# Patient Record
Sex: FEMALE | Race: WHITE | NOT HISPANIC OR LATINO | Employment: OTHER | URBAN - METROPOLITAN AREA
[De-identification: names, ages, dates, MRNs, and addresses within clinical notes are randomized per-mention and may not be internally consistent; named-entity substitution may affect disease eponyms.]

---

## 2017-08-30 ENCOUNTER — ALLSCRIPTS OFFICE VISIT (OUTPATIENT)
Dept: OTHER | Facility: OTHER | Age: 62
End: 2017-08-30

## 2018-01-15 VITALS
HEART RATE: 88 BPM | DIASTOLIC BLOOD PRESSURE: 112 MMHG | SYSTOLIC BLOOD PRESSURE: 184 MMHG | TEMPERATURE: 100 F | RESPIRATION RATE: 16 BRPM

## 2018-11-20 ENCOUNTER — OFFICE VISIT (OUTPATIENT)
Dept: SURGERY | Facility: CLINIC | Age: 63
End: 2018-11-20
Payer: MEDICARE

## 2018-11-20 VITALS
DIASTOLIC BLOOD PRESSURE: 88 MMHG | BODY MASS INDEX: 40.74 KG/M2 | WEIGHT: 221.4 LBS | HEIGHT: 62 IN | TEMPERATURE: 98 F | SYSTOLIC BLOOD PRESSURE: 158 MMHG

## 2018-11-20 DIAGNOSIS — K43.2 INCISIONAL HERNIA, WITHOUT OBSTRUCTION OR GANGRENE: Primary | ICD-10-CM

## 2018-11-20 PROCEDURE — 99215 OFFICE O/P EST HI 40 MIN: CPT | Performed by: SURGERY

## 2018-11-20 RX ORDER — DULOXETIN HYDROCHLORIDE 60 MG/1
30 CAPSULE, DELAYED RELEASE ORAL DAILY
COMMUNITY

## 2018-11-20 RX ORDER — IMIPRAMINE HCL 50 MG
50 TABLET ORAL
COMMUNITY

## 2018-11-20 RX ORDER — LOSARTAN POTASSIUM 100 MG/1
100 TABLET ORAL DAILY
COMMUNITY

## 2018-11-20 RX ORDER — PREDNISONE 1 MG/1
5 TABLET ORAL DAILY
COMMUNITY
End: 2018-12-05

## 2018-11-20 RX ORDER — CLONIDINE HYDROCHLORIDE 0.3 MG/1
0.3 TABLET ORAL 2 TIMES DAILY
COMMUNITY

## 2018-11-20 RX ORDER — DILTIAZEM HYDROCHLORIDE 120 MG/1
120 TABLET, FILM COATED ORAL EVERY 8 HOURS SCHEDULED
COMMUNITY

## 2018-11-20 RX ORDER — MELOXICAM 15 MG/1
15 TABLET ORAL DAILY
COMMUNITY

## 2018-11-20 RX ORDER — DULOXETIN HYDROCHLORIDE 30 MG/1
30 CAPSULE, DELAYED RELEASE ORAL DAILY
COMMUNITY

## 2018-11-20 NOTE — PROGRESS NOTES
Office Visit - General Surgery  Silke Murguia MRN: [id number]  Encounter: [id number]    Assessment and Plan    Problem List Items Addressed This Visit        Other    Incisional hernia, without obstruction or gangrene - Primary     I spent about 40 minutes going over her case with her  She has a longstanding history of multiple medical comorbidities including myasthenia gravis  She reports having sinus arrest syndrome  She complains of chest pain at night specially when laying on her 1 side  She has shortness of breath almost continuously even on steroids which seemed to be helping  She has a large incisional hernia which has gotten larger  She keeps asking if fixing the hernia will make her lung issues better  I told her reducing the a hernia why actually push up on her diaphragms and may compromise that a little more  I told him very reluctant to go ahead with her cardiac condition  I told her she should follow up with her cardiologist to see where things stand with that  She knows that being on steroids that decreases wound healing  She can cut back on those somewhat  I had a long talk with her about types of repair either doing a component separation or bridging type repair with mesh  We agreed that doing more of an intraperitoneal underlay mesh with bridging may be more appropriate for her which should directly decrease the operative time  I think this would have less effect on her lungs and doing a component separation with things may be tightened up in increase her respiratory difficulties  She will give us call when she seen her cardiologist with and I will discuss with her further  Chief Complaint:  Silke Murguia is a 70 y o  female who presents for Abdominal Pain (Consult abdominal wall hernia)    Subjective  68-year-old female who presents with a known incisional hernia  She was seen here last year for evaluation and put off because of medical comorbidities    She returns saying that the hernia is getting bigger  Things are pushing up on her lung on the right side  She has discomfort in the upper abdomen  Her stools have actually improved  Past Medical History  History reviewed  No pertinent past medical history  Past Surgical History  Past Surgical History:   Procedure Laterality Date    ABDOMINAL SURGERY      HYSTERECTOMY      JOINT REPLACEMENT         Family History  History reviewed  No pertinent family history  Medications  No current outpatient prescriptions on file prior to visit  No current facility-administered medications on file prior to visit  Allergies  Allergies   Allergen Reactions    Duramorph [Morphine] Hives    Lovenox [Enoxaparin] Other (See Comments)     Chemical reaction    Lyrica [Pregabalin] Hives    Suture Hives    Tegaderm Chg Dressing [Chlorhexidine] Other (See Comments)     Chemical burn    Epinephrine     Latex     Procardia [Nifedipine] Hives    Sulfa Antibiotics        Review of Systems   Constitutional: Negative for activity change, appetite change and chills  HENT: Negative for dental problem and ear pain  Eyes: Negative for photophobia and visual disturbance  Respiratory: Positive for shortness of breath  Cardiovascular: Positive for chest pain  Gastrointestinal: Positive for abdominal distention  Negative for diarrhea and nausea  Endocrine: Negative for cold intolerance and heat intolerance  Genitourinary: Negative for hematuria  Musculoskeletal: Negative for arthralgias  Skin: Negative for color change  Allergic/Immunologic: Positive for immunocompromised state  Neurological: Negative for dizziness and syncope  Hematological: Negative for adenopathy  Psychiatric/Behavioral: Negative for behavioral problems and confusion         Objective  Vitals:    11/20/18 1026   BP: 158/88   Temp: 98 °F (36 7 °C)       Physical Exam   Constitutional:   Obese white female   HENT:   Head: Normocephalic and atraumatic  Eyes: Pupils are equal, round, and reactive to light  No scleral icterus  Abdominal:   Midline incision lower abdomen left-sided transverse incision both well healed  Large bulging especially standing in the upper abdomen extending to the left side  Laying down is at least a 16 x 16 cm defect in the fascia in the upper abdomen  To the left of the umbilicus hernia extends laterally about 8 cm    Remaining abdomen is soft

## 2018-11-20 NOTE — LETTER
November 20, 2018     Daxa Fraser DO  [address] [facility] [address]    Patient: Alejandro Hurt   YOB: 1955   Date of Visit: 11/20/2018       Dear Dr Jack Garcia: Thank you for referring Alejandro Hurt to me for evaluation  Below are my notes for this consultation  If you have questions, please do not hesitate to call me  I look forward to following your patient along with you  Sincerely,        Parke Fothergill, MD        CC: No Recipients  Parke Fothergill, MD  11/20/2018 12:06 PM  Sign at close encounter  Office Visit - [address] MRN: [id number]  Encounter: [id number]    Assessment and Plan    Problem List Items Addressed This Visit        Other    Incisional hernia, without obstruction or gangrene - Primary     I spent about 40 minutes going over her case with her  She has a longstanding history of multiple medical comorbidities including myasthenia gravis  She reports having sinus arrest syndrome  She complains of chest pain at night specially when laying on her 1 side  She has shortness of breath almost continuously even on steroids which seemed to be helping  She has a large incisional hernia which has gotten larger  She keeps asking if fixing the hernia will make her lung issues better  I told her reducing the a hernia why actually push up on her diaphragms and may compromise that a little more  I told him very reluctant to go ahead with her cardiac condition  I told her she should follow up with her cardiologist to see where things stand with that  She knows that being on steroids that decreases wound healing  She can cut back on those somewhat  I had a long talk with her about types of repair either doing a component separation or bridging type repair with mesh  We agreed that doing more of an intraperitoneal underlay mesh with bridging may be more appropriate for her which should directly decrease the operative time    I think this would have less effect on her lungs and doing a component separation with things may be tightened up in increase her respiratory difficulties  She will give us call when she seen her cardiologist with and I will discuss with her further  Chief Complaint:  Abhilash Clayton is a 70 y o  female who presents for Abdominal Pain (Consult abdominal wall hernia)    Subjective  26-year-old female who presents with a known incisional hernia  She was seen here last year for evaluation and put off because of medical comorbidities  She returns saying that the hernia is getting bigger  Things are pushing up on her lung on the right side  She has discomfort in the upper abdomen  Her stools have actually improved  Past Medical History  History reviewed  No pertinent past medical history  Past Surgical History  Past Surgical History:   Procedure Laterality Date    ABDOMINAL SURGERY      HYSTERECTOMY      JOINT REPLACEMENT         Family History  History reviewed  No pertinent family history  Medications  No current outpatient prescriptions on file prior to visit  No current facility-administered medications on file prior to visit  Allergies  Allergies   Allergen Reactions    Duramorph [Morphine] Hives    Lovenox [Enoxaparin] Other (See Comments)     Chemical reaction    Lyrica [Pregabalin] Hives    Suture Hives    Tegaderm Chg Dressing [Chlorhexidine] Other (See Comments)     Chemical burn    Epinephrine     Latex     Procardia [Nifedipine] Hives    Sulfa Antibiotics        Review of Systems   Constitutional: Negative for activity change, appetite change and chills  HENT: Negative for dental problem and ear pain  Eyes: Negative for photophobia and visual disturbance  Respiratory: Positive for shortness of breath  Cardiovascular: Positive for chest pain  Gastrointestinal: Positive for abdominal distention  Negative for diarrhea and nausea     Endocrine: Negative for cold intolerance and heat intolerance  Genitourinary: Negative for hematuria  Musculoskeletal: Negative for arthralgias  Skin: Negative for color change  Allergic/Immunologic: Positive for immunocompromised state  Neurological: Negative for dizziness and syncope  Hematological: Negative for adenopathy  Psychiatric/Behavioral: Negative for behavioral problems and confusion  Objective  Vitals:    11/20/18 1026   BP: 158/88   Temp: 98 °F (36 7 °C)       Physical Exam   Constitutional:   Obese white female   HENT:   Head: Normocephalic and atraumatic  Eyes: Pupils are equal, round, and reactive to light  No scleral icterus  Abdominal:   Midline incision lower abdomen left-sided transverse incision both well healed  Large bulging especially standing in the upper abdomen extending to the left side  Laying down is at least a 16 x 16 cm defect in the fascia in the upper abdomen  To the left of the umbilicus hernia extends laterally about 8 cm    Remaining abdomen is soft

## 2018-11-20 NOTE — ASSESSMENT & PLAN NOTE
I spent about 40 minutes going over her case with her  She has a longstanding history of multiple medical comorbidities including myasthenia gravis  She reports having sinus arrest syndrome  She complains of chest pain at night specially when laying on her 1 side  She has shortness of breath almost continuously even on steroids which seemed to be helping  She has a large incisional hernia which has gotten larger  She keeps asking if fixing the hernia will make her lung issues better  I told her reducing the a hernia why actually push up on her diaphragms and may compromise that a little more  I told him very reluctant to go ahead with her cardiac condition  I told her she should follow up with her cardiologist to see where things stand with that  She knows that being on steroids that decreases wound healing  She can cut back on those somewhat  I had a long talk with her about types of repair either doing a component separation or bridging type repair with mesh  We agreed that doing more of an intraperitoneal underlay mesh with bridging may be more appropriate for her which should directly decrease the operative time  I think this would have less effect on her lungs and doing a component separation with things may be tightened up in increase her respiratory difficulties  She will give us call when she seen her cardiologist with and I will discuss with her further

## 2018-12-05 ENCOUNTER — OFFICE VISIT (OUTPATIENT)
Dept: URGENT CARE | Age: 63
End: 2018-12-05
Payer: MEDICARE

## 2018-12-05 VITALS
DIASTOLIC BLOOD PRESSURE: 114 MMHG | OXYGEN SATURATION: 96 % | BODY MASS INDEX: 40.67 KG/M2 | HEART RATE: 104 BPM | HEIGHT: 62 IN | SYSTOLIC BLOOD PRESSURE: 176 MMHG | RESPIRATION RATE: 20 BRPM | TEMPERATURE: 99.4 F | WEIGHT: 221 LBS

## 2018-12-05 DIAGNOSIS — J22 LOWER RESPIRATORY INFECTION: Primary | ICD-10-CM

## 2018-12-05 PROCEDURE — 99213 OFFICE O/P EST LOW 20 MIN: CPT | Performed by: PHYSICIAN ASSISTANT

## 2018-12-05 PROCEDURE — G0463 HOSPITAL OUTPT CLINIC VISIT: HCPCS | Performed by: PHYSICIAN ASSISTANT

## 2018-12-05 RX ORDER — CHOLECALCIFEROL (VITAMIN D3) 1250 MCG
CAPSULE ORAL
COMMUNITY
Start: 2018-07-14

## 2018-12-05 RX ORDER — IMIPRAMINE HCL 25 MG
1 TABLET ORAL
COMMUNITY

## 2018-12-05 RX ORDER — CLONIDINE HYDROCHLORIDE 0.3 MG/1
0.3 TABLET ORAL 3 TIMES DAILY
COMMUNITY
Start: 2018-11-05 | End: 2019-11-05

## 2018-12-05 RX ORDER — DULOXETIN HYDROCHLORIDE 30 MG/1
30 CAPSULE, DELAYED RELEASE ORAL
COMMUNITY
Start: 2018-11-05

## 2018-12-05 RX ORDER — BIOTIN 1 MG
TABLET ORAL
COMMUNITY

## 2018-12-05 RX ORDER — SUMATRIPTAN 50 MG/1
TABLET, FILM COATED ORAL
COMMUNITY

## 2018-12-05 RX ORDER — CLONIDINE HYDROCHLORIDE 0.2 MG/1
TABLET ORAL
COMMUNITY

## 2018-12-05 RX ORDER — MELOXICAM 15 MG/1
1 TABLET ORAL DAILY
COMMUNITY

## 2018-12-05 RX ORDER — LOSARTAN POTASSIUM 100 MG/1
1 TABLET ORAL DAILY
COMMUNITY

## 2018-12-05 RX ORDER — DULOXETIN HYDROCHLORIDE 60 MG/1
CAPSULE, DELAYED RELEASE ORAL DAILY
COMMUNITY

## 2018-12-05 RX ORDER — LISINOPRIL 5 MG/1
TABLET ORAL
COMMUNITY
Start: 2015-03-03

## 2018-12-05 RX ORDER — AMOXICILLIN AND CLAVULANATE POTASSIUM 875; 125 MG/1; MG/1
1 TABLET, FILM COATED ORAL EVERY 12 HOURS SCHEDULED
Qty: 14 TABLET | Refills: 0 | Status: SHIPPED | OUTPATIENT
Start: 2018-12-05 | End: 2018-12-12

## 2018-12-05 RX ORDER — DILTIAZEM HYDROCHLORIDE 120 MG/1
1 CAPSULE, COATED, EXTENDED RELEASE ORAL 2 TIMES DAILY
COMMUNITY

## 2018-12-05 RX ORDER — IMIPRAMINE HCL 50 MG
TABLET ORAL
COMMUNITY
Start: 2014-11-05

## 2018-12-05 RX ORDER — GABAPENTIN 300 MG/1
300 CAPSULE ORAL
COMMUNITY
Start: 2015-07-23

## 2018-12-05 NOTE — PATIENT INSTRUCTIONS
Take antibiotic as directed until completed  Follow up with PCP in 3-5 days  Proceed to  ER if symptoms worsen  Acute Bronchitis   AMBULATORY CARE:   Acute bronchitis  is swelling and irritation in the air passages of your lungs  This irritation may cause you to cough or have other breathing problems  Acute bronchitis often starts because of another illness, such as a cold or the flu  The illness spreads from your nose and throat to your windpipe and airways  Bronchitis is often called a chest cold  Acute bronchitis lasts about 3 to 6 weeks and is usually not a serious illness  Your cough can last for several weeks  You may have any of the following symptoms:   · A cough with sputum that may be clear, yellow, or green    · Feeling more tired than usual, and body aches    · A fever and chills    · Wheezing when you breathe    · A tight chest or pain when you breathe or cough  Seek care immediately if:   · You cough up blood  · Your lips or fingernails turn blue  · You feel like you are not getting enough air when you breathe  Contact your healthcare provider if:   · You have a fever  · Your breathing problems do not go away or get worse  · Your cough does not get better within 4 weeks  · You have questions or concerns about your condition or care  Self-care:   · Get more rest   Rest helps your body to heal  Slowly start to do more each day  Rest when you feel it is needed  · Avoid irritants in the air  Avoid chemicals, fumes, and dust  Wear a face mask if you must work around dust or fumes  Stay inside on days when air pollution levels are high  If you have allergies, stay inside when pollen counts are high  Do not use aerosol products, such as spray-on deodorant, bug spray, and hair spray  · Do not smoke or be around others who smoke  Nicotine and other chemicals in cigarettes and cigars damages the cilia that move mucus out of your lungs   Ask your healthcare provider for information if you currently smoke and need help to quit  E-cigarettes or smokeless tobacco still contain nicotine  Talk to your healthcare provider before you use these products  · Drink liquids as directed  Liquids help keep your air passages moist and help you cough up mucus  You may need to drink more liquids when you have acute bronchitis  Ask how much liquid to drink each day and which liquids are best for you  · Use a humidifier or vaporizer  Use a cool mist humidifier or a vaporizer to increase air moisture in your home  This may make it easier for you to breathe and help decrease your cough  Prevent acute bronchitis by doing the following:   · Get the vaccinations you need  Ask your healthcare provider if you should get vaccinated against the flu or pneumonia  · Prevent the spread of germs  You can decrease your risk of acute bronchitis and other illnesses by doing the following:     [facility] your hands often with soap and water  Carry germ-killing hand lotion or gel with you  You can use the lotion or gel to clean your hands when soap and water are not available  ¨ Do not touch your eyes, nose, or mouth unless you have washed your hands first     ¨ Always cover your mouth when you cough to prevent the spread of germs  It is best to cough into a tissue or your shirt sleeve instead of into your hand  Ask those around you cover their mouths when they cough  ¨ Try to avoid people who have a cold or the flu  If you are sick, stay away from others as much as possible  Medicines: Your healthcare provider may  give you any of the following:  · Ibuprofen or acetaminophen  are medicines that help lower your fever  They are available without a doctor's order  Ask your healthcare provider which medicine is right for you  Ask how much to take and how often to take it  Follow directions  These medicines can cause stomach bleeding if not taken correctly  Ibuprofen can cause kidney damage   Do not take ibuprofen if you have kidney disease, an ulcer, or allergies to aspirin  Acetaminophen can cause liver damage  Do not take more than 4,000 milligrams in 24 hours  · Decongestants  help loosen mucus in your lungs and make it easier to cough up  This can help you breathe easier  · Cough suppressants  decrease your urge to cough  If your cough produces mucus, do not take a cough suppressant unless your healthcare provider tells you to  Your healthcare provider may suggest that you take a cough suppressant at night so you can rest     · Inhalers  may be given  Your healthcare provider may give you one or more inhalers to help you breathe easier and cough less  An inhaler gives your medicine to open your airways  Ask your healthcare provider to show you how to use your inhaler correctly  Follow up with your healthcare provider as directed:  Write down questions you have so you will remember to ask them during your follow-up visits  © 2017 [employer]  Information is for End User's use only and may not be sold, redistributed or otherwise used for commercial purposes  All illustrations and images included in CareNotes® are the copyrighted property of [employer]  or [employer]  The above information is an  only  It is not intended as medical advice for individual conditions or treatments  Talk to your doctor, nurse or pharmacist before following any medical regimen to see if it is safe and effective for you

## 2018-12-05 NOTE — PROGRESS NOTES
[facility]        NAME: Fany Al is a 70 y o  female  : 1955    MRN: [id number]  DATE: 2018  TIME: 5:01 PM    Assessment and Plan   Lower respiratory infection [J22]  1  Lower respiratory infection  amoxicillin-clavulanate (AUGMENTIN) 875-125 mg per tablet         Patient Instructions     Take antibiotic as directed until completed  Follow up with PCP in 3-5 days  Proceed to  ER if symptoms worsen  Chief Complaint     Chief Complaint   Patient presents with    Wheezing     Pt saw her rheumatologist on Monday and was told she had wheezing in both lungs, and as of yesterday pt c/o of mid back pain that feels like a pressure hug  Pt states the pain is worse upon exhalation  Pt states she is normally SOB, denies increase in SOB  Pt recently dc'd prednisone (10mg/day) on  that she was taking since 2018 for her myasthenia gravis  History of Present Illness       38-year-old female presents with 3 day history of wheezing mild cough and some pain in the thoracic back area  Denies any fevers  But has been having some chills  Patient reports that she usually runs at 80° F and today she is at 80 which would be abnormal for her  Denies any nausea vomiting diarrhea  No chest pain  Other   This is a new problem  The current episode started in the past 7 days  The problem has been waxing and waning  Associated symptoms include chills, congestion and coughing  Pertinent negatives include no abdominal pain, arthralgias, chest pain, fever, nausea, sore throat, vomiting or weakness  Exacerbated by: Deep breath  She has tried nothing for the symptoms  The treatment provided no relief  Review of Systems   Review of Systems   Constitutional: Positive for chills  Negative for fever  HENT: Positive for congestion  Negative for sore throat  Eyes: Negative  Respiratory: Positive for cough  Cardiovascular: Negative  Negative for chest pain  Gastrointestinal: Negative  Negative for abdominal pain, nausea and vomiting  Musculoskeletal: Negative  Negative for arthralgias  Skin: Negative  Neurological: Negative  Negative for weakness           Current Medications       Current Outpatient Prescriptions:     Cholecalciferol (VITAMIN D3) 03182 units CAPS, TK 1 C PO Q WK, Disp: , Rfl:     cloNIDine (CATAPRES) 0 3 mg tablet, Take 0 3 mg by mouth, Disp: , Rfl:     diltiazem (CARDIZEM LA) 120 MG 24 hr tablet, Take 120 mg by mouth, Disp: , Rfl:     DULoxetine (CYMBALTA) 30 mg delayed release capsule, Take 30 mg by mouth, Disp: , Rfl:     gabapentin (NEURONTIN) 300 mg capsule, Take 300 mg by mouth, Disp: , Rfl:     imipramine (TOFRANIL) 50 mg tablet, Take by mouth, Disp: , Rfl:     lisinopril (ZESTRIL) 5 mg tablet, Take by mouth, Disp: , Rfl:     Multiple Vitamins-Minerals (MULTIVITAMINS) CHEW, Chew, Disp: , Rfl:     amoxicillin-clavulanate (AUGMENTIN) 875-125 mg per tablet, Take 1 tablet by mouth every 12 (twelve) hours for 7 days, Disp: 14 tablet, Rfl: 0    Biotin 1000 MCG tablet, Take by mouth, Disp: , Rfl:     Cholecalciferol (VITAMIN D-3) 5000 units TABS, Take by mouth, Disp: , Rfl:     cloNIDine (CATAPRES) 0 2 mg tablet, Take by mouth, Disp: , Rfl:     cloNIDine (CATAPRES) 0 3 mg tablet, Take 0 3 mg by mouth 2 (two) times a day, Disp: , Rfl:     diltiazem (CARDIZEM) 120 MG tablet, Take 120 mg by mouth every 8 (eight) hours, Disp: , Rfl:     diltiazem (DILTIAZEM CD) 120 mg 24 hr capsule, Take 1 capsule by mouth daily, Disp: , Rfl:     DULoxetine (CYMBALTA) 30 mg delayed release capsule, Take 30 mg by mouth daily, Disp: , Rfl:     DULoxetine (CYMBALTA) 60 mg delayed release capsule, Take 30 mg by mouth daily, Disp: , Rfl:     DULoxetine (CYMBALTA) 60 mg delayed release capsule, Take by mouth daily, Disp: , Rfl:     imipramine (TOFRANIL) 25 mg tablet, Take 1 tablet by mouth, Disp: , Rfl:     imipramine (TOFRANIL) 50 mg tablet, Take 50 mg by mouth daily at bedtime, Disp: , Rfl:     losartan (COZAAR) 100 MG tablet, Take 100 mg by mouth daily, Disp: , Rfl:     losartan (COZAAR) 100 MG tablet, Take 1 tablet by mouth daily, Disp: , Rfl:     meloxicam (MOBIC) 15 mg tablet, Take 15 mg by mouth daily, Disp: , Rfl:     meloxicam (MOBIC) 15 mg tablet, Take 1 tablet by mouth daily, Disp: , Rfl:     Riboflavin (VITAMIN B-2) 100 MG TABS, Take by mouth, Disp: , Rfl:     SUMAtriptan (IMITREX) 50 mg tablet, Take by mouth, Disp: , Rfl:     Current Allergies     Allergies as of 12/05/2018 - Reviewed 12/05/2018   Allergen Reaction Noted    Bee venom Anaphylaxis 10/29/2015    Duramorph [morphine] Hives 10/29/2015    Epinephrine Anaphylaxis 04/04/2016    Lovenox [enoxaparin] Other (See Comments), Hives, and GI Intolerance 09/20/2010    Lyrica [pregabalin] Hives and Other (See Comments) 08/30/2017    Sulfa antibiotics Hives 10/29/2015    Suture Hives 11/20/2018    Tegaderm chg dressing [chlorhexidine] Other (See Comments) 11/20/2018    Ace inhibitors Other (See Comments) 09/01/2016    Beta adrenergic blockers  11/17/2016    Fentanyl Hives 08/02/2018    Flu virus vaccine  06/19/2007    Haemophilus influenzae  11/17/2016    Hydromorphone Hives 04/30/2012    Latex  06/18/2009    Morphine and related Hives 02/29/2016    Procardia [nifedipine] Hives 09/20/2010    Propofol Hives 09/26/2016    Silver  07/20/2017    Sulfacetamide Hives 10/29/2015    Trazodone  04/16/2007            The following portions of the patient's history were reviewed and updated as appropriate: allergies, current medications, past family history, past medical history, past social history, past surgical history and problem list      Past Medical History:   Diagnosis Date    Myasthenia gravis ([facility] [address] )        Past Surgical History:   Procedure Laterality Date    ABDOMINAL SURGERY      HYSTERECTOMY      JOINT REPLACEMENT         No family history on file        Medications have been verified  Objective   BP (!) 176/114 (BP Location: Right arm, Patient Position: Sitting, Cuff Size: Large)   Pulse 104   Temp 99 4 °F (37 4 °C) (Tympanic)   Resp 20   Ht 5' 2" (1 575 m)   Wt 100 kg (221 lb)   SpO2 96%   BMI 40 42 kg/m²        Physical Exam     Physical Exam   Constitutional: She is oriented to person, place, and time  She appears well-developed and well-nourished  No distress  HENT:   Head: Normocephalic and atraumatic  Right Ear: External ear normal    Left Ear: External ear normal    Nose: Nose normal    Mouth/Throat: Oropharynx is clear and moist  No oropharyngeal exudate  Eyes: Conjunctivae are normal  Right eye exhibits no discharge  Left eye exhibits no discharge  Neck: Normal range of motion  Neck supple  Cardiovascular: Normal rate, regular rhythm, normal heart sounds and intact distal pulses  No murmur heard  Pulmonary/Chest: Effort normal  No respiratory distress  She has decreased breath sounds (Mild)  She has no wheezes  She has no rhonchi  She has no rales  Abdominal: Soft  Bowel sounds are normal  There is no tenderness  Musculoskeletal: Normal range of motion  Lymphadenopathy:     She has no cervical adenopathy  Neurological: She is alert and oriented to person, place, and time  Skin: Skin is warm and dry  Psychiatric: She has a normal mood and affect  Nursing note and vitals reviewed

## 2019-09-17 ENCOUNTER — OFFICE VISIT (OUTPATIENT)
Dept: SURGERY | Facility: CLINIC | Age: 64
End: 2019-09-17
Payer: MEDICARE

## 2019-09-17 VITALS
SYSTOLIC BLOOD PRESSURE: 138 MMHG | HEART RATE: 98 BPM | TEMPERATURE: 98.4 F | HEIGHT: 62 IN | WEIGHT: 194 LBS | DIASTOLIC BLOOD PRESSURE: 82 MMHG | BODY MASS INDEX: 35.7 KG/M2

## 2019-09-17 DIAGNOSIS — K43.2 INCISIONAL HERNIA, WITHOUT OBSTRUCTION OR GANGRENE: Primary | ICD-10-CM

## 2019-09-17 PROCEDURE — 99213 OFFICE O/P EST LOW 20 MIN: CPT | Performed by: SURGERY

## 2019-09-17 NOTE — PROGRESS NOTES
Office Visit - General Surgery  Toni Nelson MRN: [id number]  Encounter: [id number]    Assessment and Plan    Problem List Items Addressed This Visit        Other    Incisional hernia, without obstruction or gangrene - Primary     At this time, I told I do not think there is any signs or symptoms associated with obstruction or incarceration  I told her abdomen would be much harder if something was stuck  II would still consider her very high risk for any surgery  Asked her to follow things along  She will call us if needed  Chief Complaint:  Toni Nelson is a 75 y o  female who presents for Abdominal Pain (f/u incisional hernia pain  last visit 11/20/18)    Subjective  64-year-old white female whose had an incisional hernia for some time  She recently has been having pain  She notices when she eats  Kind of in the upper abdomen  Since last seen over the past year she has had problems with sepsis knee replacement knee revision etc   She has lost a significant amount of weight  She does have some associated nausea  Past Medical History  Past Medical History:   Diagnosis Date    Arthritis     Fibromyalgia     H/O blood clots     History of restrictive pulmonary disease     Hypertension     Myasthenia gravis ([address] )     PVC (premature ventricular contraction)     Sick sinus syndrome (HCC)        Past Surgical History  Past Surgical History:   Procedure Laterality Date    ABDOMINAL SURGERY      BOWEL RESECTION      CARPAL TUNNEL RELEASE Bilateral     COLON SURGERY      HYSTERECTOMY      JOINT REPLACEMENT      KNEE SURGERY Right        Family History  Family History   Problem Relation Age of Onset    Diabetes Mother     Thyroid cancer Mother     Lymphoma Mother     Hypertension Mother     Parkinsonism Father     Dementia Father        Social History  Social History     Socioeconomic History    Marital status:       Spouse name: None    Number of children: None    Years of education: None    Highest education level: None   Occupational History    None   Social Needs    Financial resource strain: None    Food insecurity:     Worry: None     Inability: None    Transportation needs:     Medical: None     Non-medical: None   Tobacco Use    Smoking status: Never Smoker    Smokeless tobacco: Never Used   Substance and Sexual Activity    Alcohol use: No    Drug use: No    Sexual activity: None   Lifestyle    Physical activity:     Days per week: None     Minutes per session: None    Stress: None   Relationships    Social connections:     Talks on phone: None     Gets together: None     Attends Mosque service: None     Active member of club or organization: None     Attends meetings of clubs or organizations: None     Relationship status: None    Intimate partner violence:     Fear of current or ex partner: None     Emotionally abused: None     Physically abused: None     Forced sexual activity: None   Other Topics Concern    None   Social History Narrative    None        Medications  Current Outpatient Medications on File Prior to Visit   Medication Sig Dispense Refill    cloNIDine (CATAPRES) 0 3 mg tablet Take 0 3 mg by mouth 3 (three) times a day       diltiazem (DILTIAZEM CD) 120 mg 24 hr capsule Take 1 capsule by mouth 2 (two) times a day       DULoxetine (CYMBALTA) 30 mg delayed release capsule Take 30 mg by mouth      DULoxetine (CYMBALTA) 60 mg delayed release capsule Take by mouth daily      imipramine (TOFRANIL) 50 mg tablet Take 50 mg by mouth daily at bedtime      losartan (COZAAR) 100 MG tablet Take 100 mg by mouth daily      Biotin 1000 MCG tablet Take by mouth      Cholecalciferol (VITAMIN D-3) 5000 units TABS Take by mouth      Cholecalciferol (VITAMIN D3) 69456 units CAPS TK 1 C PO Q WK      cloNIDine (CATAPRES) 0 2 mg tablet Take by mouth      cloNIDine (CATAPRES) 0 3 mg tablet Take 0 3 mg by mouth 2 (two) times a day      diltiazem (CARDIZEM LA) 120 MG 24 hr tablet Take 120 mg by mouth      diltiazem (CARDIZEM) 120 MG tablet Take 120 mg by mouth every 8 (eight) hours      DULoxetine (CYMBALTA) 30 mg delayed release capsule Take 30 mg by mouth daily      DULoxetine (CYMBALTA) 60 mg delayed release capsule Take 30 mg by mouth daily      gabapentin (NEURONTIN) 300 mg capsule Take 300 mg by mouth      imipramine (TOFRANIL) 25 mg tablet Take 1 tablet by mouth      imipramine (TOFRANIL) 50 mg tablet Take by mouth      lisinopril (ZESTRIL) 5 mg tablet Take by mouth      losartan (COZAAR) 100 MG tablet Take 1 tablet by mouth daily      meloxicam (MOBIC) 15 mg tablet Take 15 mg by mouth daily      meloxicam (MOBIC) 15 mg tablet Take 1 tablet by mouth daily      Multiple Vitamins-Minerals (MULTIVITAMINS) CHEW Chew      Riboflavin (VITAMIN B-2) 100 MG TABS Take by mouth      SUMAtriptan (IMITREX) 50 mg tablet Take by mouth       No current facility-administered medications on file prior to visit          Allergies  Allergies   Allergen Reactions    Bee Venom Anaphylaxis    Duramorph [Morphine] Hives    Epinephrine Anaphylaxis    Lovenox [Enoxaparin] Other (See Comments), Hives and GI Intolerance     Chemical burn  Chemical burn  Chemical burn  Chemical reaction    Lyrica [Pregabalin] Hives and Other (See Comments)     Emotional lability    Sulfa Antibiotics Hives     Patient reacts to hydrogel used in electrodes please use paper or pediatric electrodes    Suture Hives    Tegaderm Chg Dressing [Chlorhexidine] Other (See Comments)     Chemical burn    Ace Inhibitors Other (See Comments)     Cough, ELEVATED BP    Beta Adrenergic Blockers      contraindicated in myesthenia    Fentanyl Hives    Flu Virus Vaccine     Haemophilus Influenzae     Hydromorphone Hives    Latex      Latex Bandaides  Latex Bandaides    Morphine And Related Hives    Procardia [Nifedipine] Hives    Propofol Hives    Silver     Sulfacetamide Hives    Trazodone      swelling       Review of Systems    Objective  Vitals:    09/17/19 0854   BP: 138/82   Pulse: 98   Temp: 98 4 °F (36 9 °C)       Physical Exam   Constitutional: She is oriented to person, place, and time  No distress  Somewhat thin white female   HENT:   Head: Normocephalic and atraumatic  Right Ear: External ear normal    Left Ear: External ear normal    Eyes: Conjunctivae are normal  No scleral icterus  Neck: Neck supple  No tracheal deviation present  No thyromegaly present  Cardiovascular: Normal rate, regular rhythm and normal heart sounds  Exam reveals no friction rub  No murmur heard  Pulmonary/Chest: Effort normal and breath sounds normal  No respiratory distress  She has no wheezes  She has no rales  Abdominal: Soft  She exhibits no distension and no mass  There is no tenderness  There is no rebound and no guarding  He lower midline an mid transverse incision well healed  Abdomen is soft minimal tenderness, no rebound, no guarding  In the upper abdomen, there is hernia defects and most of the bowel is somewhat reducible with no real tenderness  Musculoskeletal: Normal range of motion  She exhibits no edema  Lymphadenopathy:     She has no cervical adenopathy  Neurological: She is alert and oriented to person, place, and time  Skin: Skin is warm and dry  She is not diaphoretic  Psychiatric: She has a normal mood and affect  Her behavior is normal  Judgment and thought content normal    Nursing note and vitals reviewed

## 2019-09-17 NOTE — ASSESSMENT & PLAN NOTE
At this time, I told I do not think there is any signs or symptoms associated with obstruction or incarceration  I told her abdomen would be much harder if something was stuck  II would still consider her very high risk for any surgery  Asked her to follow things along  She will call us if needed